# Patient Record
Sex: FEMALE | Race: WHITE | Employment: FULL TIME | URBAN - METROPOLITAN AREA
[De-identification: names, ages, dates, MRNs, and addresses within clinical notes are randomized per-mention and may not be internally consistent; named-entity substitution may affect disease eponyms.]

---

## 2023-02-02 ENCOUNTER — APPOINTMENT (OUTPATIENT)
Dept: GENERAL RADIOLOGY | Age: 48
End: 2023-02-02
Payer: COMMERCIAL

## 2023-02-02 ENCOUNTER — APPOINTMENT (OUTPATIENT)
Dept: ULTRASOUND IMAGING | Age: 48
End: 2023-02-02
Payer: COMMERCIAL

## 2023-02-02 ENCOUNTER — HOSPITAL ENCOUNTER (EMERGENCY)
Age: 48
Discharge: HOME | End: 2023-02-02
Payer: COMMERCIAL

## 2023-02-02 VITALS
OXYGEN SATURATION: 97 % | SYSTOLIC BLOOD PRESSURE: 113 MMHG | WEIGHT: 140 LBS | DIASTOLIC BLOOD PRESSURE: 75 MMHG | HEART RATE: 80 BPM | BODY MASS INDEX: 28.22 KG/M2 | HEIGHT: 59 IN | TEMPERATURE: 98.6 F | RESPIRATION RATE: 18 BRPM

## 2023-02-02 DIAGNOSIS — S80.12XA CONTUSION OF LEFT LOWER EXTREMITY, INITIAL ENCOUNTER: Primary | ICD-10-CM

## 2023-02-02 PROCEDURE — 99284 EMERGENCY DEPT VISIT MOD MDM: CPT

## 2023-02-02 PROCEDURE — 93971 EXTREMITY STUDY: CPT

## 2023-02-02 PROCEDURE — 73590 X-RAY EXAM OF LOWER LEG: CPT

## 2023-02-02 NOTE — ED PROVIDER NOTES
Independent MYRTLE Visit. [facility]  Department of Emergency Medicine   ED  Encounter Note  Admit Date/RoomTime: 2023  3:14 PM  ED Room:     NAME: Sherice Frederick  : 1975  MRN: [id number]     Chief Complaint:  Swelling (Above L ankle bump and swelling no injury) and Ankle Pain    History of Present Illness        Flaquita Elder is a 52 y.o. old female who presents to the emergency department by private vehicle, for non-traumatic sudden onset of Left shin swelling, which began a few day(s) prior to arrival.  Patient has no prior history of pain/injury with regards to today's visit. Since onset the symptoms are remaining constant and are mild in severity. Patient denies any short of breath, chest pain, lightness or dizziness. She denies any injury or trauma. Associated Signs & Symptoms: none    []  Fever     []  Cough     []  Dyspnea     []  Hemoptysis     []  Chest Pain       ROS   Pertinent positives and negatives are stated within HPI, all other systems reviewed and are negative. Past Medical History:  has no past medical history on file. Surgical History:  has no past surgical history on file. Social History:      Family History: family history is not on file. Allergies: Patient has no known allergies. Physical Exam   Oxygen Saturation Interpretation: Normal.        ED Triage Vitals [23 1509]   BP Temp Temp Source Heart Rate Resp SpO2 Height Weight   113/75 98.6 °F (37 °C) Oral 80 18 97 % 4' 11\" (1.499 m) 140 lb (63.5 kg)         Constitutional:  Alert, development consistent with age. HEENT:  NC/NT. Airway patent. Neck:  Normal ROM. Supple. Respiratory:  Clear to auscultation and breath sounds equal.  CV:  Regular rate and rhythm, normal heart sounds, without pathological murmurs, ectopy, gallops, or rubs. GI:  Abdomen Soft, nontender, good bowel sounds. No firm or pulsatile mass. Lower Ext.:  Left: medial lower tib/fib. Tenderness: Mild. Swelling: Mild. Deformity: no.             ROM: full range of motion. Calf:  bilateral, No cords or calf tenderness. .            Edema:  none both lower extremity(s). Skin:  no wounds or erythema. Distal Function:              Motor deficit: none. Sensory deficit: none. Pulse deficit: none. Capillary refill: normal.  Gait: normal without use of mobility aid  Integument:  Normal turgor. Warm, dry, without visible rash, unless noted elsewhere. Neurological:  Oriented. Motor functions intact. Lab / Imaging Results   (All laboratory and radiology results have been personally reviewed by myself)  Labs:  No results found for this visit on 02/02/23. Imaging: All Radiology results interpreted by Radiologist unless otherwise noted. XR TIBIA FIBULA LEFT (2 VIEWS)   Final Result   No acute osseous abnormality. US DUP LOWER EXTREMITY LEFT DELFINA   Final Result   No evidence of DVT in the left lower extremity. RECOMMENDATIONS:   Unavailable             ED Course / Medical Decision Making   Medications - No data to display     Consult(s):   None    Procedure(s):   None    Medical Decision Making    Patient presents to the ER for left medial lower shin swelling and pain with no known injury or trauma. Social Determinants include   Social Connections: Not on file    Social Determinants : None. Chronic conditions  No past medical history on file. Avis Ruffing Physical exam patient has mild swelling/contusion to the lower tib-fib medial aspect. There is no neurovascular or neurological deficits. Vital signs stable. Differential diagnoses include but not limited to fracture versus contusion versus DVT. Diagnostic studies interpreted by me revealed x-ray of the tib-fib was unremarkable. Ultrasound of the left lower extremity reveals no evidence of DVT.   She no short breath, chest pain, ice or dizziness. . Consults included none. Results were discussed with patient herself. Patient states she did not anything for pain. Patient will be discharged home with the following prescriptions, none. Discussed appropriate use and potential side effects of starting the prescribed medications. Patient continues to be non-toxic on re-evaluation. Findings were discussed with the patient and reasons to immediately return to the ED were articulated to them. They will follow-up with their PMD.      Discharge Instructions:   Patient referred to  Pippa Estrada MD  2000 \[facility] [place] [facility]\""  [phone]    Call   to schedule an appt for follow up in 1-2 days    [address] [place] [address] Emergency Department  [address] [facility]  [phone]  Go to   If symptoms worsen    MEDICATIONS:   DISCHARGE MEDICATIONS:  New Prescriptions    No medications on file       DISCONTINUED MEDICATIONS:  Discontinued Medications    No medications on file       Record Review:  Records Reviewed : None       Disposition Considerations: This patient's ED course included: a personal history and physicial examination and re-evaluation prior to disposition  This patient has remained hemodynamically stable during their ED course. I emphasized the importance of follow-up with the physician I referred them to in the timeframe recommended. I discussed with the patient emergent symptoms and the need to immediately return to the ER. Written information was included in their discharge instructions. Additional verbal discharge instructions were also given and discussed with the patient to supplement those generated by the EMR. We also discussed medications that were prescribed  (if any) including common side effects and interactions.  The patient was advised to abstain from driving, operating heavy machinery or making significant decisions while taking medications such as opiates and muscle relaxers that may impair this. All questions were addressed. They understand return precautions and discharge instructions. The patient  expressed understanding. Vitals were stable and they were in no distress at discharge. Plan of Care/Counseling:  MANE Parker CNP reviewed today's visit with the patient in addition to providing specific details for the plan of care and counseling regarding the diagnosis and prognosis. Questions are answered at this time and are agreeable with the plan. Assessment      1. Contusion of left lower extremity, initial encounter      Plan   Discharged home. Patient condition is stable    New Medications     New Prescriptions    No medications on file     Electronically signed by MANE Parker CNP   DD: 2/2/23  **This report was transcribed using voice recognition software. Every effort was made to ensure accuracy; however, inadvertent computerized transcription errors may be present.   END OF ED PROVIDER NOTE       MANE Parker CNP  02/02/23 0839

## 2024-09-30 ENCOUNTER — OFFICE VISIT (OUTPATIENT)
Dept: FAMILY MEDICINE CLINIC | Age: 49
End: 2024-09-30
Payer: COMMERCIAL

## 2024-09-30 VITALS
BODY MASS INDEX: 30.37 KG/M2 | HEART RATE: 77 BPM | DIASTOLIC BLOOD PRESSURE: 80 MMHG | HEIGHT: 58 IN | WEIGHT: 144.7 LBS | SYSTOLIC BLOOD PRESSURE: 122 MMHG | OXYGEN SATURATION: 97 % | TEMPERATURE: 97.1 F

## 2024-09-30 DIAGNOSIS — G47.30 SLEEP APNEA, UNSPECIFIED TYPE: Primary | ICD-10-CM

## 2024-09-30 DIAGNOSIS — Z23 FLU VACCINE NEED: ICD-10-CM

## 2024-09-30 PROCEDURE — 90661 CCIIV3 VAC ABX FR 0.5 ML IM: CPT | Performed by: NURSE PRACTITIONER

## 2024-09-30 PROCEDURE — 1036F TOBACCO NON-USER: CPT | Performed by: NURSE PRACTITIONER

## 2024-09-30 PROCEDURE — G8427 DOCREV CUR MEDS BY ELIG CLIN: HCPCS | Performed by: NURSE PRACTITIONER

## 2024-09-30 PROCEDURE — 99204 OFFICE O/P NEW MOD 45 MIN: CPT | Performed by: NURSE PRACTITIONER

## 2024-09-30 PROCEDURE — 90471 IMMUNIZATION ADMIN: CPT | Performed by: NURSE PRACTITIONER

## 2024-09-30 PROCEDURE — G8417 CALC BMI ABV UP PARAM F/U: HCPCS | Performed by: NURSE PRACTITIONER

## 2024-09-30 RX ORDER — SPIRONOLACTONE 100 MG/1
100 TABLET, FILM COATED ORAL 2 TIMES DAILY
COMMUNITY
Start: 2024-09-26

## 2024-09-30 RX ORDER — RIZATRIPTAN BENZOATE 10 MG/1
TABLET ORAL
COMMUNITY

## 2024-09-30 RX ORDER — FLUTICASONE PROPIONATE 50 MCG
1 SPRAY, SUSPENSION (ML) NASAL DAILY
COMMUNITY

## 2024-09-30 RX ORDER — AZELASTINE 1 MG/ML
1 SPRAY, METERED NASAL 2 TIMES DAILY
COMMUNITY

## 2024-09-30 SDOH — ECONOMIC STABILITY: FOOD INSECURITY: WITHIN THE PAST 12 MONTHS, THE FOOD YOU BOUGHT JUST DIDN'T LAST AND YOU DIDN'T HAVE MONEY TO GET MORE.: NEVER TRUE

## 2024-09-30 SDOH — ECONOMIC STABILITY: INCOME INSECURITY: HOW HARD IS IT FOR YOU TO PAY FOR THE VERY BASICS LIKE FOOD, HOUSING, MEDICAL CARE, AND HEATING?: NOT HARD AT ALL

## 2024-09-30 SDOH — HEALTH STABILITY: PHYSICAL HEALTH: ON AVERAGE, HOW MANY DAYS PER WEEK DO YOU ENGAGE IN MODERATE TO STRENUOUS EXERCISE (LIKE A BRISK WALK)?: 4 DAYS

## 2024-09-30 SDOH — HEALTH STABILITY: PHYSICAL HEALTH: ON AVERAGE, HOW MANY MINUTES DO YOU ENGAGE IN EXERCISE AT THIS LEVEL?: 60 MIN

## 2024-09-30 SDOH — ECONOMIC STABILITY: FOOD INSECURITY: WITHIN THE PAST 12 MONTHS, YOU WORRIED THAT YOUR FOOD WOULD RUN OUT BEFORE YOU GOT MONEY TO BUY MORE.: NEVER TRUE

## 2024-09-30 ASSESSMENT — PATIENT HEALTH QUESTIONNAIRE - PHQ9
SUM OF ALL RESPONSES TO PHQ QUESTIONS 1-9: 0
1. LITTLE INTEREST OR PLEASURE IN DOING THINGS: NOT AT ALL
2. FEELING DOWN, DEPRESSED OR HOPELESS: NOT AT ALL
SUM OF ALL RESPONSES TO PHQ9 QUESTIONS 1 & 2: 0
SUM OF ALL RESPONSES TO PHQ QUESTIONS 1-9: 0

## 2024-09-30 NOTE — PROGRESS NOTES
Flaquita Leavitt (:  1975) is a 49 y.o. female,New patient, here for evaluation of the following chief complaint(s):  New Patient (Establish ) and Other (Pt was diagnosed with sleep apnea years ago, but feels like it has gotten worse, asking for a referral for ENT )         Assessment & Plan      No follow-ups on file.       Subjective   Pt presents for new establishment. C/o snoring and is still tired in am snoring is waking her up. States she feels heaviness in her throat not around sinuses.         Review of Systems   Constitutional:         Tired through out day bc not getting a lot of rest.  Denies fever, chills, night sweats    HENT:  Positive for congestion.         Does get congestion, with some drainage down the back of throat. Denies sore throat   Eyes:         Denies itchy eyes or drainage   Respiratory:          Cesar SOB, cough   Cardiovascular:         Denies chest pain and palpitations    Gastrointestinal:         Denies constipation diarrhea    Genitourinary:         Denies frequency and dysuria   Musculoskeletal: Negative.    Skin: Negative.    Neurological: Negative.    Psychiatric/Behavioral: Negative.            Objective   Physical Exam  Constitutional:       Appearance: Normal appearance.   HENT:      Head: Normocephalic.      Nose: Nose normal.   Eyes:      Conjunctiva/sclera: Conjunctivae normal.      Pupils: Pupils are equal, round, and reactive to light.   Cardiovascular:      Rate and Rhythm: Regular rhythm.      Pulses: Normal pulses.      Heart sounds: Normal heart sounds.   Pulmonary:      Effort: Pulmonary effort is normal.      Breath sounds: Normal breath sounds.   Abdominal:      Palpations: Abdomen is soft.   Musculoskeletal:         General: Normal range of motion.      Cervical back: Normal range of motion.   Skin:     General: Skin is warm and dry.      Capillary Refill: Capillary refill takes less than 2 seconds.   Neurological:      General: No focal deficit present.

## 2025-01-20 ENCOUNTER — OFFICE VISIT (OUTPATIENT)
Dept: SLEEP MEDICINE | Age: 50
End: 2025-01-20
Payer: COMMERCIAL

## 2025-01-20 VITALS
BODY MASS INDEX: 31.75 KG/M2 | RESPIRATION RATE: 14 BRPM | HEIGHT: 58 IN | TEMPERATURE: 97.9 F | HEART RATE: 76 BPM | OXYGEN SATURATION: 97 % | DIASTOLIC BLOOD PRESSURE: 75 MMHG | SYSTOLIC BLOOD PRESSURE: 113 MMHG | WEIGHT: 151.24 LBS

## 2025-01-20 DIAGNOSIS — G47.33 OSA (OBSTRUCTIVE SLEEP APNEA): Primary | ICD-10-CM

## 2025-01-20 DIAGNOSIS — E66.9 OBESITY (BMI 30-39.9): ICD-10-CM

## 2025-01-20 PROCEDURE — 99204 OFFICE O/P NEW MOD 45 MIN: CPT | Performed by: STUDENT IN AN ORGANIZED HEALTH CARE EDUCATION/TRAINING PROGRAM

## 2025-01-20 PROCEDURE — 1036F TOBACCO NON-USER: CPT | Performed by: STUDENT IN AN ORGANIZED HEALTH CARE EDUCATION/TRAINING PROGRAM

## 2025-01-20 PROCEDURE — G8427 DOCREV CUR MEDS BY ELIG CLIN: HCPCS | Performed by: STUDENT IN AN ORGANIZED HEALTH CARE EDUCATION/TRAINING PROGRAM

## 2025-01-20 PROCEDURE — G8417 CALC BMI ABV UP PARAM F/U: HCPCS | Performed by: STUDENT IN AN ORGANIZED HEALTH CARE EDUCATION/TRAINING PROGRAM

## 2025-01-20 ASSESSMENT — SLEEP AND FATIGUE QUESTIONNAIRES
HOW LIKELY ARE YOU TO NOD OFF OR FALL ASLEEP WHILE SITTING INACTIVE IN A PUBLIC PLACE: SLIGHT CHANCE OF DOZING
HOW LIKELY ARE YOU TO NOD OFF OR FALL ASLEEP WHILE SITTING QUIETLY AFTER LUNCH WITHOUT ALCOHOL: SLIGHT CHANCE OF DOZING
HOW LIKELY ARE YOU TO NOD OFF OR FALL ASLEEP IN A CAR, WHILE STOPPED FOR A FEW MINUTES IN TRAFFIC: MODERATE CHANCE OF DOZING
HOW LIKELY ARE YOU TO NOD OFF OR FALL ASLEEP WHILE LYING DOWN TO REST IN THE AFTERNOON WHEN CIRCUMSTANCES PERMIT: MODERATE CHANCE OF DOZING
HOW LIKELY ARE YOU TO NOD OFF OR FALL ASLEEP WHEN YOU ARE A PASSENGER IN A CAR FOR AN HOUR WITHOUT A BREAK: HIGH CHANCE OF DOZING
ESS TOTAL SCORE: 14
HOW LIKELY ARE YOU TO NOD OFF OR FALL ASLEEP WHILE SITTING AND READING: MODERATE CHANCE OF DOZING
HOW LIKELY ARE YOU TO NOD OFF OR FALL ASLEEP WHILE WATCHING TV: MODERATE CHANCE OF DOZING
HOW LIKELY ARE YOU TO NOD OFF OR FALL ASLEEP WHILE SITTING AND TALKING TO SOMEONE: SLIGHT CHANCE OF DOZING

## 2025-01-20 NOTE — PROGRESS NOTES
[facility] Sleep Medicine    Patient Name: Flaquita Leavitt  Age: 49 y.o.   : 1975  Date of Visit: 25    Assessment and Plan:     1. LUCI (obstructive sleep apnea)  high pre-test probability of LUCI.We will pursue home sleep testing for further evaluation given symptoms listed below.    2. Obesity (BMI 30-39.9)  Rec'd 10-20% weight loss of total body weight (if feasible). Patient instructed on proper nutrition and estimated total daily caloric expenditure for their height and weight. Discussed that LUCI may improve with weight loss, but there is no guarantee of reversal.       History:    CINTHIA Leavitt is a 49 y.o. female with  has a past medical history of Migraine and Sleep apnea. who presents as a new patient to Sleep Clinic, referred by Dr. Love, for Sleep Apnea    - Notices that she has been having significant issues with breathing prior to sleep  - Had a sleep study a \"long time ago\" and was diagnosed with LUCI  - Had septoplasty with rhinoplasty but did not go well  - Still has a deviated septum  - Has snoring, witnessed apneas, choking/gasping, fatigued throughout the day  - no alcohol, tobacco, or drug use  - , struggles in the afternoon    PMH:  Past Medical History:   Diagnosis Date    Migraine     Sleep apnea         PSH:  Past Surgical History:   Procedure Laterality Date    APPENDECTOMY      RHINOPLASTY      TUBAL LIGATION          Soc Hx:  Social History     Tobacco Use    Smoking status: Never    Smokeless tobacco: Never   Vaping Use    Vaping status: Never Used   Substance Use Topics    Alcohol use: Never    Drug use: Never        Fam Hx:  Family History   Problem Relation Age of Onset    Rashes/Skin Problems Sister     Cancer Brother         Current Outpatient Medications   Medication Sig Dispense Refill    spironolactone (ALDACTONE) 100 MG tablet Take 1 tablet by mouth 2 times daily      rizatriptan (MAXALT) 10 MG tablet TAKE 1 TABLET BY MOUTH AT ONSET OF

## 2025-02-10 ENCOUNTER — OFFICE VISIT (OUTPATIENT)
Age: 50
End: 2025-02-10

## 2025-02-10 VITALS
HEIGHT: 59 IN | RESPIRATION RATE: 16 BRPM | TEMPERATURE: 97.3 F | DIASTOLIC BLOOD PRESSURE: 66 MMHG | OXYGEN SATURATION: 99 % | BODY MASS INDEX: 30.26 KG/M2 | HEART RATE: 66 BPM | WEIGHT: 150.1 LBS | SYSTOLIC BLOOD PRESSURE: 102 MMHG

## 2025-02-10 DIAGNOSIS — J01.40 ACUTE NON-RECURRENT PANSINUSITIS: Primary | ICD-10-CM

## 2025-02-10 DIAGNOSIS — R52 BODY ACHES: ICD-10-CM

## 2025-02-10 LAB
INFLUENZA A ANTIGEN, POC: NORMAL
INFLUENZA B ANTIGEN, POC: NORMAL
Lab: NORMAL
PERFORMING INSTRUMENT: NORMAL
QC PASS/FAIL: NORMAL
SARS-COV-2, POC: NORMAL

## 2025-02-10 RX ORDER — CEFDINIR 300 MG/1
300 CAPSULE ORAL 2 TIMES DAILY
Qty: 20 CAPSULE | Refills: 0 | Status: SHIPPED | OUTPATIENT
Start: 2025-02-10 | End: 2025-02-20

## 2025-02-10 SDOH — ECONOMIC STABILITY: FOOD INSECURITY: WITHIN THE PAST 12 MONTHS, YOU WORRIED THAT YOUR FOOD WOULD RUN OUT BEFORE YOU GOT MONEY TO BUY MORE.: NEVER TRUE

## 2025-02-10 SDOH — ECONOMIC STABILITY: FOOD INSECURITY: WITHIN THE PAST 12 MONTHS, THE FOOD YOU BOUGHT JUST DIDN'T LAST AND YOU DIDN'T HAVE MONEY TO GET MORE.: NEVER TRUE

## 2025-02-10 ASSESSMENT — PATIENT HEALTH QUESTIONNAIRE - PHQ9
1. LITTLE INTEREST OR PLEASURE IN DOING THINGS: NOT AT ALL
SUM OF ALL RESPONSES TO PHQ QUESTIONS 1-9: 0
SUM OF ALL RESPONSES TO PHQ QUESTIONS 1-9: 0
2. FEELING DOWN, DEPRESSED OR HOPELESS: NOT AT ALL
SUM OF ALL RESPONSES TO PHQ QUESTIONS 1-9: 0
SUM OF ALL RESPONSES TO PHQ QUESTIONS 1-9: 0
SUM OF ALL RESPONSES TO PHQ9 QUESTIONS 1 & 2: 0

## 2025-02-10 NOTE — PROGRESS NOTES
2/10/25  Flaquita [facility] : 1975 Sex: female  Age: 49 y.o.    Chief Complaint   Patient presents with    Other     Sick. A lot of sinus pressure, DIAS       Patient complains of sinus pressure and headache times a week.  No fever, chills, sweats, shortness of breath, cough, nausea, vomiting, diarrhea.        Review of Systems   Constitutional:  Negative for activity change, appetite change, chills, diaphoresis, fatigue, fever and unexpected weight change.   HENT:  Positive for congestion, sinus pressure and sinus pain. Negative for dental problem, drooling, ear discharge, ear pain, facial swelling, hearing loss, mouth sores, nosebleeds, postnasal drip, rhinorrhea, sneezing, sore throat, tinnitus, trouble swallowing and voice change.    Eyes:  Negative for photophobia, pain, discharge, redness, itching and visual disturbance.   Respiratory:  Negative for apnea, cough, choking, chest tightness, shortness of breath, wheezing and stridor.    Cardiovascular:  Negative for chest pain, palpitations and leg swelling.   Gastrointestinal:  Negative for abdominal distention, abdominal pain, anal bleeding, blood in stool, constipation, diarrhea, nausea, rectal pain and vomiting.   Endocrine: Negative for cold intolerance, heat intolerance, polydipsia, polyphagia and polyuria.   Genitourinary:  Negative for decreased urine volume, difficulty urinating, dysuria, enuresis, flank pain, frequency, genital sores, hematuria and urgency.   Musculoskeletal:  Negative for arthralgias, back pain, gait problem, joint swelling, myalgias, neck pain and neck stiffness.   Skin:  Negative for color change, pallor, rash and wound.   Allergic/Immunologic: Negative for environmental allergies, food allergies and immunocompromised state.   Neurological:  Positive for headaches. Negative for dizziness, tremors, seizures, syncope, facial asymmetry, speech difficulty, weakness, light-headedness and numbness.   Hematological:  Negative for

## 2025-02-11 ASSESSMENT — ENCOUNTER SYMPTOMS
ABDOMINAL PAIN: 0
VOICE CHANGE: 0
CHOKING: 0
APNEA: 0
VOMITING: 0
COUGH: 0
TROUBLE SWALLOWING: 0
EYE ITCHING: 0
BACK PAIN: 0
RHINORRHEA: 0
DIARRHEA: 0
RECTAL PAIN: 0
ABDOMINAL DISTENTION: 0
EYE REDNESS: 0
EYE PAIN: 0
PHOTOPHOBIA: 0
FACIAL SWELLING: 0
SINUS PAIN: 1
BLOOD IN STOOL: 0
COLOR CHANGE: 0
SINUS PRESSURE: 1
STRIDOR: 0
EYE DISCHARGE: 0
CHEST TIGHTNESS: 0
SORE THROAT: 0
NAUSEA: 0
ANAL BLEEDING: 0
WHEEZING: 0
SHORTNESS OF BREATH: 0
CONSTIPATION: 0